# Patient Record
Sex: MALE | Race: OTHER | HISPANIC OR LATINO | Employment: FULL TIME | URBAN - NONMETROPOLITAN AREA
[De-identification: names, ages, dates, MRNs, and addresses within clinical notes are randomized per-mention and may not be internally consistent; named-entity substitution may affect disease eponyms.]

---

## 2023-01-13 ENCOUNTER — OCCUPATIONAL MEDICINE (OUTPATIENT)
Dept: URGENT CARE | Facility: CLINIC | Age: 48
End: 2023-01-13
Payer: COMMERCIAL

## 2023-01-13 VITALS
HEART RATE: 78 BPM | RESPIRATION RATE: 14 BRPM | SYSTOLIC BLOOD PRESSURE: 116 MMHG | WEIGHT: 199 LBS | OXYGEN SATURATION: 96 % | DIASTOLIC BLOOD PRESSURE: 70 MMHG | TEMPERATURE: 97.8 F
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
## 2023-01-13 DIAGNOSIS — S86.912A KNEE STRAIN, LEFT, INITIAL ENCOUNTER: ICD-10-CM

## 2023-01-13 DIAGNOSIS — H11.31 SUBCONJUNCTIVAL HEMORRHAGE OF RIGHT EYE: ICD-10-CM

## 2023-01-13 DIAGNOSIS — S39.012A LUMBOSACRAL STRAIN, INITIAL ENCOUNTER: ICD-10-CM

## 2023-01-13 DIAGNOSIS — Z02.1 PRE-EMPLOYMENT DRUG SCREENING: ICD-10-CM

## 2023-01-13 DIAGNOSIS — R20.0 NUMBNESS: ICD-10-CM

## 2023-01-13 DIAGNOSIS — M54.2 CERVICAL SPINE PAIN: ICD-10-CM

## 2023-01-13 DIAGNOSIS — S09.90XA CLOSED HEAD INJURY, INITIAL ENCOUNTER: ICD-10-CM

## 2023-01-13 DIAGNOSIS — Z02.83 ENCOUNTER FOR DRUG SCREENING: ICD-10-CM

## 2023-01-13 LAB
AMP AMPHETAMINE: NORMAL
BREATH ALCOHOL COMMENT: NORMAL
COC COCAINE: NORMAL
INT CON NEG: NEGATIVE
INT CON POS: POSITIVE
MET METHAMPHETAMINES: NORMAL
OPI OPIATES: NORMAL
PCP PHENCYCLIDINE: NORMAL
POC BREATHALIZER: 0 PERCENT (ref 0–0.01)
POC DRUG COMMENT 753798-OCCUPATIONAL HEALTH: NEGATIVE
THC: NORMAL

## 2023-01-13 PROCEDURE — 80305 DRUG TEST PRSMV DIR OPT OBS: CPT | Mod: 29 | Performed by: NURSE PRACTITIONER

## 2023-01-13 PROCEDURE — 99204 OFFICE O/P NEW MOD 45 MIN: CPT | Mod: 29 | Performed by: NURSE PRACTITIONER

## 2023-01-13 PROCEDURE — 82075 ASSAY OF BREATH ETHANOL: CPT | Mod: 29 | Performed by: NURSE PRACTITIONER

## 2023-01-13 NOTE — LETTER
"[facility]  [address] [place] [address]  Phone:  [phone] - Fax:  [phone]   [facility] Progress Report and Disability Certification  Date of Service: 1/13/2023   No Show:  No  Date / Time of Next Visit: 1/16/2023   Claim Information   Patient Name: Richard Koenig  Claim Number:     Employer: [employer]  Date of Injury: 1/11/2023     Insurer / TPA: [employer]  ID / SSN:     Occupation: Superviosr  Diagnosis: Diagnoses of Encounter for drug screening, Pre-employment drug screening, Fall, initial encounter, Closed head injury, initial encounter, Subconjunctival hemorrhage of right eye, Numbness, Cervical spine pain, Lumbosacral strain, initial encounter, and Knee strain, left, initial encounter were pertinent to this visit.    Medical Information   Related to Industrial Injury? Yes    Subjective Complaints:  DOI: 1/11/2023. Patient reportedly slipped and fell on ice after getting out of the truck on a downhill, stating he fell on his back in a twisting motion. Hit the back of his head. Denies LOC. Currently has a \"small: headache to posterior headache. Noticed redness to his right eye shortly after the fall. Has generalized left side pain with intermittent numbness, including his arm and leg. Pain greatest to left shoulder, back, and left knee. Was having weakness in bilateral hands the first day. Weakness has now resolved. No loss of bowel or bladder. Denies previous history of head injury. No report hx of issues with his back, shoulder, or knee.       Objective Findings: Physical Exam  Eyes:      Extraocular Movements: Extraocular movements intact.      Conjunctiva/sclera:      Right eye: Hemorrhage present.      Pupils: Pupils are equal, round, and reactive to light.   Musculoskeletal:         General: Tenderness present. No deformity.      Cervical back: No edema or erythema. Pain with movement, " spinous process tenderness and muscular tenderness present. Normal range of motion.      Comments: Midline lumbar sacral and bilateral paraspinal tenderness to palpation. Left medial knee tenderness to palpation. Lateral left shoulder tenderness to palpation.    Skin:     General: Skin is warm and dry.      Pre-Existing Condition(s): None reported.    Assessment:   Initial Visit    Status: Additional Care Required  Permanent Disability:No    Plan: MedicationTransfer Care    Diagnostics:      Comments:       Disability Information   Status: Released to Restricted Duty    From:  2023  Through: 2023 Restrictions are: Temporary   Physical Restrictions   Sitting:    Standing:    Stoopin hrs/day Bendin hrs/day   Squattin hrs/day Walking:    Climbin hrs/day Pushin hrs/day   Pullin hrs/day Other:    Reaching Above Shoulder (L): 0 hrs/day Reaching Above Shoulder (R):       Reaching Below Shoulder (L):    Reaching Below Shoulder (R):      Not to exceed Weight Limits   Carrying(hrs):   Weight Limit(lb): < or = to 10 pounds Lifting(hrs):   Weight  Limit(lb): < or = to 10 pounds   Comments: Patient complains of generalized left side numbness post traumatic head injury. Midline cervical tenderness to palpation. Referred to the ED for further evaluation.     -NSAID's (ibuprofen) and tylenol as directed for pain and inflammation.   -RICE Therapy: Rest, Ice, Compression, Elevation  -Compression with an elastic bandage for swelling.  -Range of motion exercises.  -Follow up in 3 days.      Repetitive Actions   Hands: i.e. Fine Manipulations from Grasping:     Feet: i.e. Operating Foot Controls:     Driving / Operate Machinery:     Health Care Provider’s Original or Electronic Signature  ELZA Vences. Health Care Provider’s Original or Electronic Signature    Enoc Bethea DO MPH     Clinic Name / Location: [facility] - [address]  [address] Clinic Phone Number: Dept: [phone]   Appointment Time: 4:05 Pm Visit Start Time: 4:35 PM   Check-In Time:  4:19 Pm Visit Discharge Time:  544pm   Original-Treating Physician or Chiropractor    Page 2-Insurer/TPA    Page 3-Employer    Page 4-Employee

## 2023-01-13 NOTE — LETTER
"EMPLOYEE’S CLAIM FOR COMPENSATION/ REPORT OF INITIAL TREATMENT  FORM C-4    EMPLOYEE’S CLAIM - PROVIDE ALL INFORMATION REQUESTED   First Name  Richard Last Name  Cookie Koenig Birthdate                    1975                Sex  male Claim Number (Insurer’s Use Only)    Home Address   [address] Age  47 y.o. 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 (Be specific an answer in detail. Use additional sheet if necessary)  i was walking and slipped on the ice   If you believe that you have an occupational disease, when did you first have knowledge of the disability and it relationship to your employment?   Witnesses to the Accident  N/A      Nature of Injury or Occupational Disease  Contusion  Part(s) of Body Injured or Affected  Spinal Cord - Neck, Upper Back Area (Thoracic Area), Upper Arm (L)    I certify that the above is true and correct to the best of my knowledge and that I have " provided this information in order to obtain the benefits of [place]  I hereby authorize any physician, chiropractor, surgeon, practitioner, or other person, any hospital, including [facility] hospital, any medical service organization, any insurance company, or other institution or organization to release to each other, any medical or other information, including benefits paid or payable, pertinent to this injury or disease, except information relative to diagnosis, treatment and/or counseling for AIDS, psychological conditions, alcohol or controlled substances, for which I must give specific authorization.  A Photostat of this authorization shall be as valid as the original.     Date   Place Employee’s Original or  *Electronic Signature   THIS REPORT MUST BE COMPLETED AND MAILED WITHIN 3 WORKING DAYS OF TREATMENT   Place  [facility]  Name of Facility  [facility]   Date  1/13/2023 Diagnosis and Description of Injury or Occupational Disease  (Z02.83) Encounter for drug screening  (Z02.1) Pre-employment drug screening  (W19.XXXA) Fall, initial encounter  (S09.90XA) Closed head injury, initial encounter  (H11.31) Subconjunctival hemorrhage of right eye  (R20.0) Numbness  (M54.2) Cervical spine pain  (S39.012A) Lumbosacral strain, initial encounter  (S86.912A) Knee strain, left, initial encounter Is there evidence the injured employee was under the influence of alcohol and/or another controlled substance at the time of accident?  ? No ? Yes (if yes, please explain)    Hour  4:35 PM   Diagnoses of Encounter for drug screening, Pre-employment drug screening, Fall, initial encounter, Closed head injury, initial encounter, Subconjunctival hemorrhage of right eye, Numbness, Cervical spine pain, Lumbosacral strain, initial encounter, and Knee strain, left, initial  encounter were pertinent to this visit. No   Treatment  Patient complains of generalized left side numbness post traumatic head injury. Midline cervical tenderness to palpation. Referred to the ED for further evaluation.     -NSAID's (ibuprofen) and tylenol as directed for pain and inflammation.   -RICE Therapy: Rest, Ice, Compression, Elevation  -Compression with an elastic bandage for swelling.  -Range of motion exercises.  -Follow up in 3 days.    Have you advised the patient to remain off work five days or     more?    X-Ray Findings      ? Yes Indicate dates:   From   To      From information given by the employee, together with medical evidence, can        you directly connect this injury or occupational disease as job incurred?  Yes ? No If no, is the injured employee capable of:  ? full duty  No ? modified duty  Yes   Is additional medical care by a physician indicated?  Yes If Modified Duty, Specify any Limitations / Restrictions  Note D-39   Do you know of any previous injury or disease contributing to this condition or occupational disease?  ? Yes ? No (Explain if yes)                          No   Date  1/13/2023 Print Health Care Provider's   AHMET Vences I certify the employer’s copy of  this form was mailed on:   Address  [address] Insurer’s Use Only     [place] [place] [facility] [place] Zip  [address]    Provider’s Tax ID Number  [id number] Telephone  Dept: [phone]             Health Care Provider’s Original or Electronic Signature  e-EDI Negrete Degree (MD,DO, DC,PA-C,APRN)   APRN      * Complete and attach Release of Information (Form C-4A) when injured employee signs C-4 Form electronically  ORIGINAL - TREATING HEALTHCARE PROVIDER PAGE 2 - INSURER/TPA PAGE 3 - EMPLOYER PAGE 4 - EMPLOYEE             Form C-4 (rev.08/21)           BRIEF DESCRIPTION OF RIGHTS AND BENEFITS  (Pursuant to [place]RS 616C.050)    Notice of Injury or Occupational Disease (Incident Report Form  "C-1): If an injury or occupational disease (OD) arises out of and in the course of employment, you must provide written notice to your employer as soon as practicable, but no later than 7 days after the accident or OD. Your employer shall maintain a sufficient supply of the required forms.    Claim for Compensation (Form C-4): If medical treatment is sought, the form C-4 is available at the place of initial treatment. A completed \"Claim for Compensation\" (Form C-4) must be filed within 90 days after an accident or OD. The treating physician or chiropractor must, within 3 working days after treatment, complete and mail to the employer, the employer's insurer and third-party , the Claim for Compensation.    Medical Treatment: If you require medical treatment for your on-the-job injury or OD, you may be required to select a physician or chiropractor from a list provided by your workers’ compensation insurer, if it has contracted with an Organization for Managed Care (MCO) or Preferred Provider Organization (PPO) or providers of health care. If your employer has not entered into a contract with an MCO or PPO, you may select a physician or chiropractor from the Panel of Physicians and Chiropractors. Any medical costs related to your industrial injury or OD will be paid by your insurer.    Temporary Total Disability (TTD): If your doctor has certified that you are unable to work for a period of at least 5 consecutive days, or 5 cumulative days in a 20-day period, or places restrictions on you that your employer does not accommodate, you may be entitled to TTD compensation.    Temporary Partial Disability (TPD): If the wage you receive upon reemployment is less than the compensation for TTD to which you are entitled, the insurer may be required to pay you TPD compensation to make up the difference. TPD can only be paid for a maximum of 24 months.    Permanent Partial Disability (PPD): When your medical " condition is stable and there is an indication of a PPD as a result of your injury or OD, within 30 days, your insurer must arrange for an evaluation by a rating physician or chiropractor to determine the degree of your PPD. The amount of your PPD award depends on the date of injury, the results of the PPD evaluation, your age and wage.    Permanent Total Disability (PTD): If you are medically certified by a treating physician or chiropractor as permanently and totally disabled and have been granted a PTD status by your insurer, you are entitled to receive monthly benefits not to exceed 66 2/3% of your average monthly wage. The amount of your PTD payments is subject to reduction if you previously received a lump-sum PPD award.    Vocational Rehabilitation Services: You may be eligible for vocational rehabilitation services if you are unable to return to the job due to a permanent physical impairment or permanent restrictions as a result of your injury or occupational disease.    Transportation and Per [place] Reimbursement: You may be eligible for travel expenses and per elton associated with medical treatment.    Reopening: You may be able to reopen your claim if your condition worsens after claim closure.     Appeal Process: If you disagree with a written determination issued by the insurer or the insurer does not respond to your request, you may appeal to the Department of Administration, , by following the instructions contained in your determination letter. You must appeal the determination within 70 days from the date of the determination letter at [address], [address], [place] [address], or [address], [address], [place] [address]. If you disagree with the  decision, you may appeal to the Department of Administration, . You must file your appeal within 30 days from the date of the  decision letter at [address], [address], [place], [place] [address], or [address], [place], [place] [address]. If you disagree with a decision of an , you may file a petition for judicial review with the District Court. You must do so within 30 days of the Appeal Officer’s decision. You may be represented by an  at your own expense or you may contact the [facility] for possible representation.    [place]  for Injured Workers (NAIW): If you disagree with a  decision, you may request that NAIW represent you without charge at an  Hearing. For information regarding denial of benefits, you may contact the [facility] at: [address], [address], [place] [address], [phone], or [address], [address], [place] [address], [phone]    To File a Complaint with the Division: If you wish to file a complaint with the  of the Division of Industrial Relations (DIR),  please contact the Workers’ Compensation Section, [address], [address], [place], [place] [address], telephone [phone], or [address], [address], [place], [place] [address], telephone [phone].    For assistance with Workers’ Compensation Issues: You may contact the [facility] Office for Consumer Health Assistance, [address], [address], [place] [address], Toll Free [phone], Web site: http://[facility].[place].gov/Programs/BELEN E-mail: belen@[facility].[place].gov              __________________________________________________________________                                    _________________            Employee Name / Signature                                                                                                                            Date                                                                                                                                                                                                                               D-2 (rev. 10/20)

## 2023-01-14 NOTE — PROGRESS NOTES
"Subjective     Richard Koenig is a 47 y.o. male who presents with Fall (DOI 1/11/23, Pt states slipped on ice getting out of work truck, head injury, back pain, body soreness, R eye redness)            DOI: 1/11/2023. Patient reportedly slipped and fell on ice after getting out of the truck on a downhill, stating he fell on his back in a twisting motion. Hit the back of his head. Denies LOC. Currently has a \"small: headache to posterior headache. Noticed redness to his right eye shortly after the fall. Has generalized left side pain with intermittent numbness, including his arm and leg. Pain greatest to left shoulder, back, and left knee. Was having weakness in bilateral hands the first day. Weakness has now resolved. No loss of bowel or bladder. Denies previous history of head injury. No report hx of issues with his back, shoulder, or knee.        Fall  Associated symptoms include headaches. Pertinent negatives include no loss of consciousness.     Review of Systems   Eyes:  Negative for pain.   Musculoskeletal:  Positive for back pain, falls and joint pain.   Neurological:  Positive for sensory change and headaches. Negative for loss of consciousness.   All other systems reviewed and are negative.           Objective     /70 (BP Location: Left arm, Patient Position: Sitting, BP Cuff Size: Adult)   Pulse 78   Temp 36.6 °C (97.8 °F) (Temporal)   Resp 14   Wt 90.3 kg (199 lb)   SpO2 96%      Physical Exam  Eyes:      Extraocular Movements: Extraocular movements intact.      Conjunctiva/sclera:      Right eye: Hemorrhage present.      Pupils: Pupils are equal, round, and reactive to light.   Musculoskeletal:         General: Tenderness present. No deformity.      Cervical back: No edema or erythema. Pain with movement, spinous process tenderness and muscular tenderness present. Normal range of motion.      Comments: Midline lumbar sacral and bilateral paraspinal tenderness to palpation. Left medial " knee tenderness to palpation. Lateral left shoulder tenderness to palpation.     Left leg strength, 4/5. Right leg, 5/5.   Skin:     General: Skin is warm and dry.   Neurological:      Mental Status: He is alert.                           Assessment & Plan     1. Fall, initial encounter    2. Closed head injury, initial encounter    3. Subconjunctival hemorrhage of right eye    4. Numbness    5. Cervical spine pain    6. Lumbosacral strain, initial encounter    7. Knee strain, left, initial encounter    8. Encounter for drug screening  - POCT 6 Panel Urine Drug Screen  - POCT Breath Alcohol Test    9. Pre-employment drug screening    Patient complains of generalized left side numbness post traumatic head injury. Midline cervical tenderness to palpation. Referred to the ED for further evaluation. Discussed risks.     -NSAID's (ibuprofen) and tylenol as directed for pain and inflammation.   -RICE Therapy: Rest, Ice, Compression, Elevation  -Compression with an elastic bandage for swelling.  -Range of motion exercises.  -Follow up in 3 days.       services used for visit.

## 2023-01-19 ASSESSMENT — ENCOUNTER SYMPTOMS
EYE PAIN: 0
BACK PAIN: 1
FALLS: 1
LOSS OF CONSCIOUSNESS: 0
SENSORY CHANGE: 1
HEADACHES: 1

## 2023-01-30 ENCOUNTER — OCCUPATIONAL MEDICINE (OUTPATIENT)
Dept: URGENT CARE | Facility: CLINIC | Age: 48
End: 2023-01-30
Payer: COMMERCIAL

## 2023-01-30 VITALS
OXYGEN SATURATION: 96 % | DIASTOLIC BLOOD PRESSURE: 68 MMHG | HEART RATE: 93 BPM | TEMPERATURE: 98.6 F | RESPIRATION RATE: 16 BRPM | WEIGHT: 208 LBS | BODY MASS INDEX: 29.78 KG/M2 | SYSTOLIC BLOOD PRESSURE: 122 MMHG | HEIGHT: 70 IN
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
## 2023-01-30 DIAGNOSIS — M54.40 ACUTE LOW BACK PAIN WITH SCIATICA, SCIATICA LATERALITY UNSPECIFIED, UNSPECIFIED BACK PAIN LATERALITY: ICD-10-CM

## 2023-01-30 DIAGNOSIS — M25.562 LEFT KNEE PAIN, UNSPECIFIED CHRONICITY: ICD-10-CM

## 2023-01-30 DIAGNOSIS — S09.90XD INJURY OF HEAD, SUBSEQUENT ENCOUNTER: ICD-10-CM

## 2023-01-30 PROCEDURE — 99213 OFFICE O/P EST LOW 20 MIN: CPT | Performed by: STUDENT IN AN ORGANIZED HEALTH CARE EDUCATION/TRAINING PROGRAM

## 2023-01-30 NOTE — LETTER
[facility]  [address] [place] [address]  Phone:  [phone] - Fax:  [phone]   [facility] Progress Report and Disability Certification  Date of Service: 1/30/2023   No Show:  No  Date / Time of Next Visit: 2/3/2023   Claim Information   Patient Name: Richard Koenig  Claim Number:     Employer: [employer]  Date of Injury: 1/11/2023     Insurer / TPA: [employer]  ID / SSN:     Occupation: Superviosr  Diagnosis: Diagnoses of Fall, subsequent encounter, Injury of head, subsequent encounter, Acute low back pain with sciatica, sciatica laterality unspecified, unspecified back pain laterality, and Left knee pain, unspecified chronicity were pertinent to this visit.    Medical Information   Related to Industrial Injury?      Subjective Complaints:  FV#2 (1/30/23): Patient states he is asked still experiencing low back pain.  Patient states low back pain is sometimes there and sometimes not there at all.  He does have radiation down his leg states sometimes radiation of pain is down left leg and sometimes down right leg but never both legs at the same time. Pertinent negatives include no abdominal pain, bladder incontinence, bowel incontinence, chest pain, fever, headaches, leg pain, numbness, paresis, paresthesias, pelvic pain, perianal numbness, tingling or weakness.  States he is also still experiencing left knee pain.  Patient denies headache, dizziness, nausea/vomiting/diarrhea.     Objective Findings: Musculoskeletal:      Cervical back: Normal.      Thoracic back: Normal.      Lumbar back: Tenderness present. No swelling, deformity, spasms or bony tenderness. Decreased range of motion. Negative right straight leg raise test and negative left straight leg raise test.      Left knee: No swelling or deformity. Decreased range of motion. Normal pulse.   Neurological:      General: No focal deficit present.       Mental Status: He is alert and oriented to person, place, and time. Mental status is at baseline.      GCS: GCS eye subscore is 4. GCS verbal subscore is 5. GCS motor subscore is 6.      Cranial Nerves: Cranial nerves 2-12 are intact.      Sensory: Sensation is intact.      Motor: Motor function is intact.      Coordination: Coordination is intact.      Gait: Gait is intact.      Pre-Existing Condition(s):     Assessment:   Condition Same    Status: Discharged / Care Transfer  Permanent Disability:No    Plan:      Diagnostics:      Comments:       Disability Information   Status: Released to Restricted Duty    From:     Through: 2/3/2023 Restrictions are:     Physical Restrictions   Sitting:    Standing:    Stooping:    Bendin hrs/day   Squattin hrs/day Walking:    Climbin hrs/day Pushing:      Pulling:    Other:    Reaching Above Shoulder (L):   Reaching Above Shoulder (R):       Reaching Below Shoulder (L):    Reaching Below Shoulder (R):      Not to exceed Weight Limits   Carrying(hrs):   Weight Limit(lb):   Comments:less than 5lbs Lifting(hrs):   Weight  Limit(lb):   Comments:less than 5lbs   Comments: Supportive care measures reviewed with patient. Follow up with Occupational Med.    Repetitive Actions   Hands: i.e. Fine Manipulations from Grasping:     Feet: i.e. Operating Foot Controls:     Driving / Operate Machinery:     Health Care Provider’s Original or Electronic Signature  Belle Peña P.A.-C. Health Care Provider’s Original or Electronic Signature    Enoc Bethea DO MPH     Clinic Name / Location: [address] [address] Clinic Phone Number: Dept: [phone]   Appointment Time: 4:05 Pm Visit Start Time: 4:25 PM   Check-In Time:  3:59 Pm Visit Discharge Time: 5:15 Pm    Original-Treating Physician or Chiropractor    Page 2-Insurer/TPA    Page 3-Employer    Page 4-Employee

## 2023-01-31 ASSESSMENT — ENCOUNTER SYMPTOMS
EYE PAIN: 0
BACK PAIN: 1
PHOTOPHOBIA: 0
DIARRHEA: 0
WHEEZING: 0
PALPITATIONS: 0
VOMITING: 0
BLURRED VISION: 0
HEADACHES: 0
DOUBLE VISION: 0
EYE REDNESS: 0
EYE DISCHARGE: 0
FEVER: 0
NAUSEA: 0
DIZZINESS: 0
LOSS OF CONSCIOUSNESS: 0
CHILLS: 0
SENSORY CHANGE: 0
SHORTNESS OF BREATH: 0
WEAKNESS: 0
CONSTIPATION: 0
FOCAL WEAKNESS: 0
ABDOMINAL PAIN: 0

## 2023-01-31 NOTE — PROGRESS NOTES
"Subjective     Richard Koenig is a 47 y.o. male who presents with Follow-Up (WC Follow on on fall, Back, L knee an L shoulder, feel like he's worse then when he came in, he' s back pain is radiating down both legs, and having numbness on legs, would like to see PT or Chiropractor , pt would like to see a specialist )      FV#2 (1/30/23): Patient states he is asked still experiencing low back pain.  Patient states low back pain is sometimes there and sometimes not there at all.  He does have radiation down his leg states sometimes radiation of pain is down left leg and sometimes down right leg but never both legs at the same time. Pertinent negatives include no abdominal pain, bladder incontinence, bowel incontinence, chest pain, fever, headaches, leg pain, numbness, paresis, paresthesias, pelvic pain, perianal numbness, tingling or weakness.  States he is also still experiencing left knee pain.  Patient denies headache, dizziness, nausea/vomiting/diarrhea.       DOI 1/11/23  MERE: Pt states slipped on ice getting out of work truck, head injury, back pain, body soreness, R eye redness  VISIT#1 (1/13/23): Patient reportedly slipped and fell on ice after getting out of the truck on a downhill, stating he fell on his back in a twisting motion. Hit the back of his head. Denies LOC. Currently has a \"small: headache to posterior headache. Noticed redness to his right eye shortly after the fall. Has generalized left side pain with intermittent numbness, including his arm and leg. Pain greatest to left shoulder, back, and left knee. Was having weakness in bilateral hands the first day. Weakness has now resolved. No loss of bowel or bladder. Denies previous history of head injury. No report hx of issues with his back, shoulder, or knee. Patient to [facility] ED for further evaluation.    FV#2 (1/30/23): Patient states he is asked still experiencing low back pain.  Patient states low back pain is sometimes there and " "sometimes not there at all.  He does have radiation down his leg states sometimes radiation of pain is down left leg and sometimes down right leg but never both legs at the same time. Pertinent negatives include no abdominal pain, bladder incontinence, bowel incontinence, chest pain, fever, headaches, leg pain, numbness, paresis, paresthesias, pelvic pain, perianal numbness, tingling or weakness.  States he is also still experiencing left knee pain.  Patient denies headache, dizziness, nausea/vomiting/diarrhea.    HPI    Review of Systems   Constitutional:  Negative for chills and fever.   Eyes:  Negative for blurred vision, double vision, photophobia, pain, discharge and redness.   Respiratory:  Negative for shortness of breath and wheezing.    Cardiovascular:  Negative for chest pain and palpitations.   Gastrointestinal:  Negative for abdominal pain, constipation, diarrhea, nausea and vomiting.   Musculoskeletal:  Positive for back pain and joint pain.   Neurological:  Negative for dizziness, sensory change, focal weakness, loss of consciousness, weakness and headaches.   All other systems reviewed and are negative.           Objective     /68   Pulse 93   Temp 37 °C (98.6 °F) (Temporal)   Resp 16   Ht 1.778 m (5' 10\")   Wt 94.3 kg (208 lb)   SpO2 96%   BMI 29.84 kg/m²      Physical Exam  Musculoskeletal:      Cervical back: Normal.      Thoracic back: Normal.      Lumbar back: Tenderness present. No swelling, deformity, spasms or bony tenderness. Decreased range of motion. Negative right straight leg raise test and negative left straight leg raise test.      Left knee: No swelling or deformity. Decreased range of motion. Normal pulse.   Neurological:      General: No focal deficit present.      Mental Status: He is alert and oriented to person, place, and time. Mental status is at baseline.      GCS: GCS eye subscore is 4. GCS verbal subscore is 5. GCS motor subscore is 6.      Cranial Nerves: " Cranial nerves 2-12 are intact.      Sensory: Sensation is intact.      Motor: Motor function is intact.      Coordination: Coordination is intact.      Gait: Gait is intact.       Musculoskeletal:      Cervical back: Normal.      Thoracic back: Normal.      Lumbar back: Tenderness present. No swelling, deformity, spasms or bony tenderness. Decreased range of motion. Negative right straight leg raise test and negative left straight leg raise test.      Left knee: No swelling or deformity. Decreased range of motion. Normal pulse.   Neurological:      General: No focal deficit present.      Mental Status: He is alert and oriented to person, place, and time. Mental status is at baseline.      GCS: GCS eye subscore is 4. GCS verbal subscore is 5. GCS motor subscore is 6.      Cranial Nerves: Cranial nerves 2-12 are intact.      Sensory: Sensation is intact.      Motor: Motor function is intact.      Coordination: Coordination is intact.      Gait: Gait is intact.                      Assessment & Plan        1. Fall, subsequent encounter  - Referral to Occupational Medicine    2. Injury of head, subsequent encounter  - Referral to Occupational Medicine    3. Acute low back pain with sciatica, sciatica laterality unspecified, unspecified back pain laterality  - Referral to Occupational Medicine    4. Left knee pain, unspecified chronicity  - Referral to Occupational Medicine     D39 completed.  Follow up with occupational medicine.    I personally reviewed prior external notes and test results pertinent to today's visit, including prior work comp visits in urgent care and in [facility].     Differential diagnoses, supportive care measures, and indications for immediate follow-up discussed with patient. Pathogenesis of diagnosis discussed including typical length and natural progression.      Instructed to return to urgent care or nearest emergency department  for any change in condition, further concerns, or new  concerning symptoms.    Patient states understanding and agree with the plan of care and discharge instructions.

## 2023-02-13 ENCOUNTER — OCCUPATIONAL MEDICINE (OUTPATIENT)
Dept: OCCUPATIONAL MEDICINE | Facility: CLINIC | Age: 48
End: 2023-02-13
Payer: COMMERCIAL

## 2023-02-13 VITALS
TEMPERATURE: 97.5 F | WEIGHT: 208 LBS | RESPIRATION RATE: 16 BRPM | BODY MASS INDEX: 29.78 KG/M2 | HEIGHT: 70 IN | OXYGEN SATURATION: 96 % | HEART RATE: 88 BPM

## 2023-02-13 DIAGNOSIS — S49.92XD INJURY OF LEFT SHOULDER, SUBSEQUENT ENCOUNTER: ICD-10-CM

## 2023-02-13 DIAGNOSIS — S16.1XXD STRAIN OF MUSCLE, FASCIA AND TENDON AT NECK LEVEL, SUBSEQUENT ENCOUNTER: ICD-10-CM

## 2023-02-13 DIAGNOSIS — M54.40 ACUTE BILATERAL LOW BACK PAIN WITH SCIATICA, SCIATICA LATERALITY UNSPECIFIED: ICD-10-CM

## 2023-02-13 DIAGNOSIS — S89.92XD INJURY OF LEFT KNEE, SUBSEQUENT ENCOUNTER: ICD-10-CM

## 2023-02-13 PROCEDURE — 99214 OFFICE O/P EST MOD 30 MIN: CPT | Performed by: NURSE PRACTITIONER

## 2023-02-13 RX ORDER — CYCLOBENZAPRINE HCL 5 MG
10 TABLET ORAL 3 TIMES DAILY PRN
Qty: 30 TABLET | Refills: 0 | Status: ON HOLD | OUTPATIENT
Start: 2023-02-13 | End: 2023-11-03

## 2023-02-13 ASSESSMENT — PAIN SCALES - GENERAL: PAINLEVEL: 7=MODERATE-SEVERE PAIN

## 2023-02-13 NOTE — PROGRESS NOTES
Subjective:     Richard Koenig is a 47 y.o. male who presents for Follow-Up (1/30/23 - Back, L knee and L shoulder - same - RM 16/)      VISIT#1 (1/13/23): Patient reportedly slipped and fell on ice after getting out of the truck on a downhill, stating he fell on his back in a twisting motion. Hit the back of his head.   used for most of the visit.  Patient states symptoms have remained unchanged particularly in the low back, the left knee, and is now experiencing some left shoulder pain.  He states that the tingling will sometimes radiate down both legs, when he goes to stand up he sometimes feels a little weak, he is turning his body certain ways to accommodate for symptoms, and driving can sometimes exacerbate symptoms.Pertinent negatives include no abdominal pain, bladder incontinence, bowel incontinence, chest pain, fever, headaches, leg pain, numbness, paresis, paresthesias, pelvic pain, perianal numbness, tingling or weakness.  No knee pain is intermittent in nature and can be isolated to the entire knee at times.  He denies knee instability, swelling, or bruising to the area.  He states that the shoulder pain has begun to increase in severity as time goes by and can be exacerbated by such movements is turning steering well.  He denies numbness or tingling or shoulder weakness.  He takes OTC medication if needed.  He has been able to tolerate light duty with some difficulty.  MRIs ordered and physiatry referral placed for further evaluation and management of symptoms.  Plan of care discussed with patient.    ROS: All systems were reviewed on intake form, form was reviewed and signed. See scanned documents in media. Pertinent positives and negatives included in HPI.    PMH: No pertinent past medical history to this problem  MEDS: Medications were reviewed in Epic  ALLERGIES: No Known Allergies  SOCHX: Works as Supervisor at [email or web address] FH: No pertinent family history to  "this problem       Objective:     Pulse 88   Temp 36.4 °C (97.5 °F) (Temporal)   Resp 16   Ht 1.778 m (5' 10\")   Wt 94.3 kg (208 lb)   SpO2 96%   BMI 29.84 kg/m²     [unfilled]    Lumbar: No gross deformity or discoloration noted.  Decreased range of motion.  Discomfort noted with rotation bilaterally and flexion.  There is mild TTP noted to the lumbar sacral paraspinal musculature.  No muscle spasms appreciated.  Minimal difficulty with heel toe walk.  Straight leg test positive on the left, negative on the right.  No gait abnormalities noted.  Left shoulder: No gross deformity or discoloration noted.  Full range of motion with mild discomfort at shoulder height.   strength is 4/5, secondary to discomfort.  Negative edema, crepitus, abnormal warmth, or ecchymosis to the joint.  Left knee: No gross deformity or discoloration noted.  There is mild TTP noted to the medial joint line, patellar joint line, and lateral joint line, denies all other tenderness.  Special tests negative.  No gait abnormalities noted.  Distal pedal and dorsal pulses 2+.  HEENT: EOMI, PERRLA, patient answers questions appropriately and in a timely manner.  Cranial nerves I through XII grossly intact.  There is mild posterior cervical tenderness noted.  Full range of motion noted with mild discomfort with rotation to the left.  No step-offs noted    Assessment/Plan:       1. Strain of muscle, fascia and tendon at neck level, subsequent encounter  - cyclobenzaprine (FLEXERIL) 5 mg tablet; Take 2 Tablets by mouth 3 times a day as needed for Moderate Pain or Muscle Spasms.  Dispense: 30 Tablet; Refill: 0  - MR-CERVICAL SPINE-W/O; Future  - Referral to Radiology  - Referral to Physical Therapy  - Referral to Pain Clinic    2. Injury of left shoulder, subsequent encounter  - cyclobenzaprine (FLEXERIL) 5 mg tablet; Take 2 Tablets by mouth 3 times a day as needed for Moderate Pain or Muscle Spasms.  Dispense: 30 Tablet; Refill: 0  - " MR-SHOULDER-W/O LEFT; Future  - Referral to Radiology  - Referral to Physical Therapy  - Referral to Pain Clinic    3. Injury of left knee, subsequent encounter  - cyclobenzaprine (FLEXERIL) 5 mg tablet; Take 2 Tablets by mouth 3 times a day as needed for Moderate Pain or Muscle Spasms.  Dispense: 30 Tablet; Refill: 0  - MR-KNEE-W/O LEFT; Future  - Referral to Radiology  - Referral to Physical Therapy  - Referral to Pain Clinic    4. Acute bilateral low back pain with sciatica, sciatica laterality unspecified  - cyclobenzaprine (FLEXERIL) 5 mg tablet; Take 2 Tablets by mouth 3 times a day as needed for Moderate Pain or Muscle Spasms.  Dispense: 30 Tablet; Refill: 0  - MR-LUMBAR SPINE-W/O; Future  - Referral to Radiology  - Referral to Physical Therapy  - Referral to Pain Clinic    Released to Restricted Duty FROM 2/13/2023 TO 2/27/2023     Follow-up in 2 weeks, unless seen by physiatry  Restricted duty, per physiatry  Physiatry referral placed, transfer care  MRIs ordered  Physical therapy referral placed  Recommend continue with OTC Tylenol/ibuprofen, ice/heat application, and OTC topical ointments i.e. Tiger balm, Voltaren gel, or Biofreeze  Take cyclobenzaprine as prescribed do not drive or work while taking this medication due to possible sedating effects  Strict ED precautions discussed with patient    Differential diagnosis, natural history, supportive care, and indications for immediate follow-up discussed.    Approximately 45 minutes were spent in reviewing notes, preparing for visit, obtaining history, exam and evaluation, patient counseling/education and post visit documentation/orders.

## 2023-02-13 NOTE — LETTER
[address],   [address] [place] [address]  Phone:  [phone] - Fax:  [phone]   Occupational Health [facility] Progress Report and Disability Certification  Date of Service: 2/13/2023   No Show:  No  Date / Time of Next Visit: 2/27/2023@2:45PM   Claim Information   Patient Name: Richard Koenig  Claim Number:     Employer: [employer]  Date of Injury: 1/11/2023     Insurer / TPA: [employer]  ID / SSN:     Occupation: Superviosr  Diagnosis: Diagnoses of Strain of muscle, fascia and tendon at neck level, subsequent encounter, Injury of left shoulder, subsequent encounter, Injury of left knee, subsequent encounter, and Acute bilateral low back pain with sciatica, sciatica laterality unspecified were pertinent to this visit.    Medical Information   Related to Industrial Injury? Yes    Subjective Complaints:  VISIT#1 (1/13/23): Patient reportedly slipped and fell on ice after getting out of the truck on a downhill, stating he fell on his back in a twisting motion. Hit the back of his head.   used for most of the visit.  Patient states symptoms have remained unchanged particularly in the low back, the left knee, and is now experiencing some left shoulder pain.  He states that the tingling will sometimes radiate down both legs, when he goes to stand up he sometimes feels a little weak, he is turning his body certain ways to accommodate for symptoms, and driving can sometimes exacerbate symptoms.Pertinent negatives include no abdominal pain, bladder incontinence, bowel incontinence, chest pain, fever, headaches, leg pain, numbness, paresis, paresthesias, pelvic pain, perianal numbness, tingling or weakness.  No knee pain is intermittent in nature and can be isolated to the entire knee at times.  He denies knee instability, swelling, or bruising to the area.  He states that the shoulder pain has begun to increase in  severity as time goes by and can be exacerbated by such movements is turning steering well.  He denies numbness or tingling or shoulder weakness.  He takes OTC medication if needed.  He has been able to tolerate light duty with some difficulty.  MRIs ordered and physiatry referral placed for further evaluation and management of symptoms.  Plan of care discussed with patient.   Objective Findings: Lumbar: No gross deformity or discoloration noted.  Decreased range of motion.  Discomfort noted with rotation bilaterally and flexion.  There is mild TTP noted to the lumbar sacral paraspinal musculature.  No muscle spasms appreciated.  Minimal difficulty with heel toe walk.  Straight leg test positive on the left, negative on the right.  No gait abnormalities noted.  Left shoulder: No gross deformity or discoloration noted.  Full range of motion with mild discomfort at shoulder height.   strength is 4/5, secondary to discomfort.  Negative edema, crepitus, abnormal warmth, or ecchymosis to the joint.  Left knee: No gross deformity or discoloration noted.  There is mild TTP noted to the medial joint line, patellar joint line, and lateral joint line, denies all other tenderness.  Special tests negative.  No gait abnormalities noted.  Distal pedal and dorsal pulses 2+.  HEENT: EOMI, PERRLA, patient answers questions appropriately and in a timely manner.  Cranial nerves I through XII grossly intact.  There is mild posterior cervical tenderness noted.  Full range of motion noted with mild discomfort with rotation to the left.  No step-offs noted   Pre-Existing Condition(s):     Assessment:   Condition Same    Status: Discharged / Care Transfer  Permanent Disability:No    Plan: Transfer CareMedication (NOT at Work)DiagnosticsPT    Diagnostics: MRI    Comments:  Follow-up in 2 weeks, unless seen by physiatry  Restricted duty, per physiatry  Physiatry referral placed, transfer care  MRIs ordered  Physical therapy referral  placed  Recommend continue with OTC Tylenol/ibuprofen, ice/heat application, and OTC topical ointments i.e. Tiger balm, Voltaren gel, or Biofreeze  Take cyclobenzaprine as prescribed do not drive or work while taking this medication due to possible sedating effects  Strict ED precautions discussed with patient    Disability Information   Status: Released to Restricted Duty    From:  2/13/2023  Through: 2/27/2023 Restrictions are: Temporary   Physical Restrictions   Sitting:    Standing:    Stooping:    Bending:      Squatting:    Walking:    Climbing:    Pushing:      Pulling:    Other:    Reaching Above Shoulder (L):   Reaching Above Shoulder (R):       Reaching Below Shoulder (L):    Reaching Below Shoulder (R):      Not to exceed Weight Limits   Carrying(hrs):   Weight Limit(lb): < or = to 10 pounds Lifting(hrs):   Weight  Limit(lb): < or = to 10 pounds   Comments:      Repetitive Actions   Hands: i.e. Fine Manipulations from Grasping:     Feet: i.e. Operating Foot Controls:     Driving / Operate Machinery:     Health Care Provider’s Original or Electronic Signature  AHMET [facility] Health Care Provider’s Original or Electronic Signature    Enoc Bethea DO MPH     Clinic Name / Location: [address],   [address] [address] Clinic Phone Number: Dept: [phone]   Appointment Time: 3:30 Pm Visit Start Time: 3:10 PM   Check-In Time:  3:02 Pm Visit Discharge Time:  4:11pm   Original-Treating Physician or Chiropractor    Page 2-Insurer/TPA    Page 3-Employer    Page 4-Employee

## 2023-03-01 ENCOUNTER — HOSPITAL ENCOUNTER (OUTPATIENT)
Dept: RADIOLOGY | Facility: MEDICAL CENTER | Age: 48
End: 2023-03-01
Attending: NURSE PRACTITIONER
Payer: COMMERCIAL

## 2023-03-01 DIAGNOSIS — S16.1XXD STRAIN OF MUSCLE, FASCIA AND TENDON AT NECK LEVEL, SUBSEQUENT ENCOUNTER: ICD-10-CM

## 2023-03-01 DIAGNOSIS — S89.92XD INJURY OF LEFT KNEE, SUBSEQUENT ENCOUNTER: ICD-10-CM

## 2023-03-01 DIAGNOSIS — M54.40 ACUTE BILATERAL LOW BACK PAIN WITH SCIATICA, SCIATICA LATERALITY UNSPECIFIED: ICD-10-CM

## 2023-03-01 DIAGNOSIS — S49.92XD INJURY OF LEFT SHOULDER, SUBSEQUENT ENCOUNTER: ICD-10-CM

## 2023-03-01 PROCEDURE — 73221 MRI JOINT UPR EXTREM W/O DYE: CPT | Mod: LT

## 2023-03-01 PROCEDURE — 72141 MRI NECK SPINE W/O DYE: CPT

## 2023-03-01 PROCEDURE — 72148 MRI LUMBAR SPINE W/O DYE: CPT

## 2023-03-01 PROCEDURE — 73721 MRI JNT OF LWR EXTRE W/O DYE: CPT | Mod: LT

## 2023-03-07 ENCOUNTER — OCCUPATIONAL MEDICINE (OUTPATIENT)
Dept: OCCUPATIONAL MEDICINE | Facility: CLINIC | Age: 48
End: 2023-03-07
Payer: COMMERCIAL

## 2023-03-07 VITALS
BODY MASS INDEX: 28.63 KG/M2 | RESPIRATION RATE: 18 BRPM | SYSTOLIC BLOOD PRESSURE: 128 MMHG | DIASTOLIC BLOOD PRESSURE: 80 MMHG | OXYGEN SATURATION: 94 % | HEIGHT: 70 IN | WEIGHT: 200 LBS | HEART RATE: 84 BPM

## 2023-03-07 DIAGNOSIS — M54.40 ACUTE BILATERAL LOW BACK PAIN WITH SCIATICA, SCIATICA LATERALITY UNSPECIFIED: ICD-10-CM

## 2023-03-07 DIAGNOSIS — S16.1XXD STRAIN OF MUSCLE, FASCIA AND TENDON AT NECK LEVEL, SUBSEQUENT ENCOUNTER: ICD-10-CM

## 2023-03-07 DIAGNOSIS — S89.92XD INJURY OF LEFT KNEE, SUBSEQUENT ENCOUNTER: ICD-10-CM

## 2023-03-07 DIAGNOSIS — S49.92XD INJURY OF LEFT SHOULDER, SUBSEQUENT ENCOUNTER: ICD-10-CM

## 2023-03-07 PROCEDURE — 99213 OFFICE O/P EST LOW 20 MIN: CPT | Performed by: NURSE PRACTITIONER

## 2023-03-07 ASSESSMENT — ENCOUNTER SYMPTOMS
NECK PAIN: 1
MYALGIAS: 1
TINGLING: 0
WEAKNESS: 0
BACK PAIN: 1
RESPIRATORY NEGATIVE: 1
CARDIOVASCULAR NEGATIVE: 1
CONSTITUTIONAL NEGATIVE: 1
PSYCHIATRIC NEGATIVE: 1
SENSORY CHANGE: 0

## 2023-03-07 NOTE — LETTER
[address],   [address] [place] [address]  Phone:  [phone] - Fax:  [phone]   Occupational Health [facility] Progress Report and Disability Certification  Date of Service: 3/7/2023   No Show:  No  Date / Time of Next Visit: 4/6/2023@4:00PM   Claim Information   Patient Name: Richard Koenig  Claim Number:     Employer: [employer]  Date of Injury: 1/11/2023     Insurer / TPA: [employer]  ID / SSN:     Occupation: Superviosr  Diagnosis: Diagnoses of Strain of muscle, fascia and tendon at neck level, subsequent encounter, Injury of left shoulder, subsequent encounter, Injury of left knee, subsequent encounter, and Acute bilateral low back pain with sciatica, sciatica laterality unspecified were pertinent to this visit.    Medical Information   Related to Industrial Injury? Yes    Subjective Complaints:  (1/13/23): Patient reportedly slipped and fell on ice after getting out of the truck on a downhill, stating he fell on his back in a twisting motion. Hit the back of his head.   used for most of the visit.  Patient states symptoms have remained unchanged particularly in the low back and the left knee.  He states that the tingling will sometimes radiate down both legs, when he goes to stand up he sometimes feels a little weak.  Pertinent negatives include no abdominal pain, bladder incontinence, bowel incontinence, chest pain, fever, headaches, leg pain, numbness, paresis, paresthesias, pelvic pain, perianal numbness, tingling or weakness.  Left knee pain is intermittent in nature and can be isolated to the entire knee at times.  He denies knee instability, swelling, or bruising to the area.  He takes OTC medication if needed.  He states that the muscle relaxer was not helpful.  He has been able to tolerate light duty.  MRIs results reviewed with patient.  Physiatry has been approved, pending scheduling.   Orthopedic referral is being placed for the left knee.  Plan of care discussed with patient.   Objective Findings: Lumbar: No gross deformity or discoloration noted.  Decreased range of motion.  Discomfort noted with rotation bilaterally and flexion.  There is mild TTP noted to the lumbar sacral paraspinal musculature.  No muscle spasms appreciated.  Minimal difficulty with heel toe walk.  Straight leg test positive on the left, negative on the right.  No gait abnormalities noted.  Left shoulder: No gross deformity or discoloration noted.  Full range of motion with mild discomfort at shoulder height.   strength is 4/5, secondary to discomfort.  Negative edema, crepitus, abnormal warmth, or ecchymosis to the joint.  Left knee: No gross deformity or discoloration noted.  There is mild TTP noted to the medial joint line, patellar joint line, and lateral joint line, denies all other tenderness.  Special tests negative.  No gait abnormalities noted.  Distal pedal and dorsal pulses 2+.  HEENT: EOMI, PERRLA, patient answers questions appropriately and in a timely manner.  Cranial nerves I through XII grossly intact.  There is mild posterior cervical tenderness noted.  Full range of motion noted with mild discomfort with rotation to the left.  No step-offs noted   Pre-Existing Condition(s):     Assessment:   Condition Same    Status: Discharged / Care Transfer  Permanent Disability:No    Plan: Transfer CarePT    Diagnostics:      Comments:  Follow-up in 4 weeks, unless seen by orthopedics  Restricted duty, per physiatry and orthopedics  Physiatry referral placed, transfer care pending scheduling  Physical therapy appointments as scheduled  Knee brace while at work otherwise wean at home as tolerated  Recommend continue with OTC Tylenol/ibuprofen, ice/heat application, and OTC topical ointments i.e. Tiger balm, Voltaren gel, or Biofreeze  Strict ED precautions discussed with patient    Disability Information   Status:  Released to Restricted Duty    From:  3/7/2023  Through: 4/6/2023 Restrictions are: Temporary   Physical Restrictions   Sitting:    Standing:    Stooping:    Bending:      Squatting:    Walking:    Climbing:    Pushing:      Pulling:    Other:    Reaching Above Shoulder (L):   Reaching Above Shoulder (R):       Reaching Below Shoulder (L):    Reaching Below Shoulder (R):      Not to exceed Weight Limits   Carrying(hrs):   Weight Limit(lb): < or = to 10 pounds Lifting(hrs):   Weight  Limit(lb): < or = to 10 pounds   Comments:      Repetitive Actions   Hands: i.e. Fine Manipulations from Grasping:     Feet: i.e. Operating Foot Controls:     Driving / Operate Machinery:     Health Care Provider’s Original or Electronic Signature  AHMET [facility] Health Care Provider’s Original or Electronic Signature    Enoc Bethea DO MPH     Clinic Name / Location: [address]   [address] [place] [address] Clinic Phone Number: Dept: [phone]   Appointment Time: 4:30 Pm Visit Start Time: 4:32 PM   Check-In Time:  4:18 Pm Visit Discharge Time:  5:00PM   Original-Treating Physician or Chiropractor    Page 2-Insurer/TPA    Page 3-Employer    Page 4-Employee

## 2023-03-08 NOTE — PROGRESS NOTES
"Subjective:     Richard Koenig is a 47 y.o. male who presents for Follow-Up (WC DOI neck/back/Lt arm, rm 16)      (1/13/23): Patient reportedly slipped and fell on ice after getting out of the truck on a downhill, stating he fell on his back in a twisting motion. Hit the back of his head.   used for most of the visit.  Patient states symptoms have remained unchanged particularly in the low back and the left knee.  He states that the tingling will sometimes radiate down both legs, when he goes to stand up he sometimes feels a little weak.  Pertinent negatives include no abdominal pain, bladder incontinence, bowel incontinence, chest pain, fever, headaches, leg pain, numbness, paresis, paresthesias, pelvic pain, perianal numbness, tingling or weakness.  Left knee pain is intermittent in nature and can be isolated to the entire knee at times.  He denies knee instability, swelling, or bruising to the area.  He takes OTC medication if needed.  He states that the muscle relaxer was not helpful.  He has been able to tolerate light duty.  MRIs results reviewed with patient.  Physiatry has been approved, pending scheduling.  Orthopedic referral is being placed for the left knee.  Plan of care discussed with patient.    Review of Systems   Constitutional: Negative.    Respiratory: Negative.     Cardiovascular: Negative.    Musculoskeletal:  Positive for back pain, joint pain, myalgias and neck pain.   Skin: Negative.    Neurological:  Negative for tingling, sensory change and weakness.   Psychiatric/Behavioral: Negative.       SOCHX: Works as Supervisor at [employer]   FH: No pertinent family history to this problem       Objective:     /80   Pulse 84   Resp 18   Ht 1.778 m (5' 10\")   Wt 90.7 kg (200 lb)   SpO2 94%   BMI 28.70 kg/m²     Constitutional: Patient is in no acute distress. Appears well-developed and well-nourished.   Cardiovascular: Normal rate.  "   Pulmonary/Chest: Effort normal. No respiratory distress.   Neurological: Patient is alert and oriented to person, place, and time.   Skin: Skin is warm and dry.   Psychiatric: Normal mood and affect. Behavior is normal.     Lumbar: No gross deformity or discoloration noted.  Decreased range of motion.  Discomfort noted with rotation bilaterally and flexion.  There is mild TTP noted to the lumbar sacral paraspinal musculature.  No muscle spasms appreciated.  Minimal difficulty with heel toe walk.  Straight leg test positive on the left, negative on the right.  No gait abnormalities noted.  Left shoulder: No gross deformity or discoloration noted.  Full range of motion with mild discomfort at shoulder height.   strength is 4/5, secondary to discomfort.  Negative edema, crepitus, abnormal warmth, or ecchymosis to the joint.  Left knee: No gross deformity or discoloration noted.  There is mild TTP noted to the medial joint line, patellar joint line, and lateral joint line, denies all other tenderness.  Special tests negative.  No gait abnormalities noted.  Distal pedal and dorsal pulses 2+.  HEENT: EOMI, PERRLA, patient answers questions appropriately and in a timely manner.  Cranial nerves I through XII grossly intact.  There is mild posterior cervical tenderness noted.  Full range of motion noted with mild discomfort with rotation to the left.  No step-offs noted    Assessment/Plan:       1. Strain of muscle, fascia and tendon at neck level, subsequent encounter    2. Injury of left shoulder, subsequent encounter    3. Injury of left knee, subsequent encounter    4. Acute bilateral low back pain with sciatica, sciatica laterality unspecified    Released to Restricted Duty FROM 3/7/2023 TO 4/6/2023       Follow-up in 4 weeks, unless seen by orthopedics  Restricted duty, per physiatry and orthopedics  Physiatry referral placed, transfer care pending scheduling  Physical therapy appointments as scheduled  Knee  brace while at work otherwise wean at home as tolerated  Recommend continue with OTC Tylenol/ibuprofen, ice/heat application, and OTC topical ointments i.e. Tiger balm, Voltaren gel, or Biofreeze  Strict ED precautions discussed with patient    Differential diagnosis, natural history, supportive care, and indications for immediate follow-up discussed.    Approximately 25 minutes was spent in preparing for visit, obtaining history, exam and evaluation, patient counseling/education and post visit documentation/orders.

## 2023-04-06 ENCOUNTER — OCCUPATIONAL MEDICINE (OUTPATIENT)
Dept: OCCUPATIONAL MEDICINE | Facility: CLINIC | Age: 48
End: 2023-04-06
Payer: COMMERCIAL

## 2023-04-06 VITALS
SYSTOLIC BLOOD PRESSURE: 122 MMHG | WEIGHT: 205 LBS | TEMPERATURE: 96.8 F | HEART RATE: 89 BPM | BODY MASS INDEX: 29.35 KG/M2 | HEIGHT: 70 IN | DIASTOLIC BLOOD PRESSURE: 84 MMHG | OXYGEN SATURATION: 95 %

## 2023-04-06 DIAGNOSIS — M54.40 ACUTE BILATERAL LOW BACK PAIN WITH SCIATICA, SCIATICA LATERALITY UNSPECIFIED: ICD-10-CM

## 2023-04-06 DIAGNOSIS — S16.1XXD STRAIN OF MUSCLE, FASCIA AND TENDON AT NECK LEVEL, SUBSEQUENT ENCOUNTER: ICD-10-CM

## 2023-04-06 DIAGNOSIS — S49.92XD INJURY OF LEFT SHOULDER, SUBSEQUENT ENCOUNTER: ICD-10-CM

## 2023-04-06 DIAGNOSIS — S89.92XD INJURY OF LEFT KNEE, SUBSEQUENT ENCOUNTER: ICD-10-CM

## 2023-04-06 PROCEDURE — 99213 OFFICE O/P EST LOW 20 MIN: CPT | Performed by: NURSE PRACTITIONER

## 2023-04-06 ASSESSMENT — ENCOUNTER SYMPTOMS
SENSORY CHANGE: 0
CARDIOVASCULAR NEGATIVE: 1
BACK PAIN: 1
WEAKNESS: 0
CONSTITUTIONAL NEGATIVE: 1
MYALGIAS: 1
TINGLING: 0
RESPIRATORY NEGATIVE: 1
PSYCHIATRIC NEGATIVE: 1

## 2023-04-06 NOTE — LETTER
[address],   [address] [place] [address]  Phone:  [phone] - Fax:  [phone]   Occupational Health [facility] Progress Report and Disability Certification  Date of Service: 4/6/2023   No Show:  No  Date / Time of Next Visit: 5/4/2023@2:45 pm   Claim Information   Patient Name: Richard Koenig  Claim Number:     Employer: [employer]  Date of Injury: 1/11/2023     Insurer / TPA: [employer]  ID / SSN:     Occupation: Superviosr  Diagnosis: Diagnoses of Strain of muscle, fascia and tendon at neck level, subsequent encounter, Injury of left shoulder, subsequent encounter, Injury of left knee, subsequent encounter, and Acute bilateral low back pain with sciatica, sciatica laterality unspecified were pertinent to this visit.    Medical Information   Related to Industrial Injury? Yes    Subjective Complaints:  1/13/23): Patient reportedly slipped and fell on ice after getting out of the truck on a downhill, stating he fell on his back in a twisting motion. Hit the back of his head.   deferred at this visit.  Patient states symptoms have remained unchanged, the knee is feeling worse.  Continued  tingling radiate down both legs, when he goes to stand up he sometimes feels a little weak.  He has increased pain with lifting more than 10lbs and sitting for extended period of times. Pertinent negatives include no abdominal pain, bladder incontinence, bowel incontinence, chest pain, fever, headaches, leg pain, numbness, paresis, paresthesias, pelvic pain, perianal numbness, tingling or weakness.  Left knee pain is intermittent in nature and getting worse.  The knee brace can be aggravating at times. He denies knee instability, swelling, or bruising to the area.  He takes OTC medication if needed.  He states that the muscle relaxer is not helpful, but he will take it if needed.  He has been able to tolerate light  duty.  Physiatry has been approved he has an appointment next week.  Orthopedic has been approved, pending scheduling.  Plan of care discussed with patient.      Objective Findings: Lumbar: No gross deformity or discoloration noted.  Decreased range of motion.  Discomfort noted with rotation bilaterally and flexion.  There is mild TTP noted to the lumbar sacral paraspinal musculature.  No muscle spasms appreciated.  Minimal difficulty with heel toe walk.  Straight leg test positive on the left, negative on the right.  No gait abnormalities noted.  Left shoulder: No gross deformity or discoloration noted.  Full range of motion with mild discomfort at shoulder height.   strength is 4/5, secondary to discomfort.  Negative edema, crepitus, abnormal warmth, or ecchymosis to the joint.  Left knee: No gross deformity or discoloration noted.  There is moderate TTP noted to the medial joint line, patellar joint line, and lateral joint line, denies all other tenderness.  Special tests negative.  No gait abnormalities noted.  Distal pedal and dorsal pulses 2+.  Neck:  There is mild posterior cervical tenderness noted.  Full range of motion noted with mild discomfort with rotation to the left.  No step-offs noted   Pre-Existing Condition(s):     Assessment:        Status: Discharged / Care Transfer  Permanent Disability:No    Plan: Transfer CareMedication (NOT at Work)    Diagnostics:      Comments:  Follow-up in 4 weeks, unless seen by orthopedics  Restricted duty, per physiatry and orthopedics  Physiatry referral placed, transfer care pending scheduling  Physical therapy appointments as scheduled  Knee brace while at work otherwise wean at home as tolerated  Recommend continue with OTC Tylenol/ibuprofen, ice/heat application, and OTC topical ointments i.e. Tiger balm, Voltaren gel, or Biofreeze  Strict ED precautions discussed with patient    Disability Information   Status: Released to Restricted Duty    From:   4/6/2023  Through: 5/4/2023 Restrictions are: Temporary   Physical Restrictions   Sitting:    Standing:    Stooping:    Bending:      Squatting:    Walking:    Climbing:    Pushing:      Pulling:    Other:    Reaching Above Shoulder (L):   Reaching Above Shoulder (R):       Reaching Below Shoulder (L):    Reaching Below Shoulder (R):      Not to exceed Weight Limits   Carrying(hrs):   Weight Limit(lb): < or = to 10 pounds Lifting(hrs):   Weight  Limit(lb): < or = to 10 pounds   Comments:      Repetitive Actions   Hands: i.e. Fine Manipulations from Grasping:     Feet: i.e. Operating Foot Controls:     Driving / Operate Machinery:     Health Care Provider’s Original or Electronic Signature  AHMET Morejon Health Care Provider’s Original or Electronic Signature    Enoc Bethea DO MPH     Clinic Name / Location: [address] [address] Clinic Phone Number: Dept: [phone]   Appointment Time: 4:00 Pm Visit Start Time: 3:25 PM   Check-In Time:  3:22 Pm Visit Discharge Time:  4:02 PM   Original-Treating Physician or Chiropractor    Page 2-Insurer/TPA    Page 3-Employer    Page 4-Employee

## 2023-04-06 NOTE — PROGRESS NOTES
"Subjective:     Richard Koenig is a 47 y.o. male who presents for Other (WC DOI 1/22/23 neck, back injury, feeling room 17)      1/13/23): Patient reportedly slipped and fell on ice after getting out of the truck on a downhill, stating he fell on his back in a twisting motion. Hit the back of his head.   deferred at this visit.  Patient states symptoms have remained unchanged, the knee is feeling worse.  Continued  tingling radiate down both legs, when he goes to stand up he sometimes feels a little weak.  He has increased pain with lifting more than 10lbs and sitting for extended period of times. Pertinent negatives include no abdominal pain, bladder incontinence, bowel incontinence, chest pain, fever, headaches, leg pain, numbness, paresis, paresthesias, pelvic pain, perianal numbness, tingling or weakness.  Left knee pain is intermittent in nature and getting worse.  The knee brace can be aggravating at times. He denies knee instability, swelling, or bruising to the area.  He takes OTC medication if needed.  He states that the muscle relaxer is not helpful, but he will take it if needed.  He has been able to tolerate light duty.  Physiatry has been approved he has an appointment next week.  Orthopedic has been approved, pending scheduling.  Plan of care discussed with patient.       Review of Systems   Constitutional: Negative.    Respiratory: Negative.     Cardiovascular: Negative.    Musculoskeletal:  Positive for back pain, joint pain and myalgias.   Skin: Negative.    Neurological:  Negative for tingling, sensory change and weakness.   Psychiatric/Behavioral: Negative.       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       Objective:     /84   Pulse 89   Temp 36 °C (96.8 °F)   Ht 1.778 m (5' 10\")   Wt 93 kg (205 lb)   SpO2 95%   BMI 29.41 kg/m²     Constitutional: Patient is in no acute distress. Appears " well-developed and well-nourished.   Cardiovascular: Normal rate.    Pulmonary/Chest: Effort normal. No respiratory distress.   Neurological: Patient is alert and oriented to person, place, and time.   Skin: Skin is warm and dry.   Psychiatric: Normal mood and affect. Behavior is normal.     Lumbar: No gross deformity or discoloration noted.  Decreased range of motion.  Discomfort noted with rotation bilaterally and flexion.  There is mild TTP noted to the lumbar sacral paraspinal musculature.  No muscle spasms appreciated.  Minimal difficulty with heel toe walk.  Straight leg test positive on the left, negative on the right.  No gait abnormalities noted.  Left shoulder: No gross deformity or discoloration noted.  Full range of motion with mild discomfort at shoulder height.   strength is 4/5, secondary to discomfort.  Negative edema, crepitus, abnormal warmth, or ecchymosis to the joint.  Left knee: No gross deformity or discoloration noted.  There is moderate TTP noted to the medial joint line, patellar joint line, and lateral joint line, denies all other tenderness.  Special tests negative.  No gait abnormalities noted.  Distal pedal and dorsal pulses 2+.  Neck:  There is mild posterior cervical tenderness noted.  Full range of motion noted with mild discomfort with rotation to the left.  No step-offs noted    Assessment/Plan:       1. Strain of muscle, fascia and tendon at neck level, subsequent encounter    2. Injury of left shoulder, subsequent encounter    3. Injury of left knee, subsequent encounter    4. Acute bilateral low back pain with sciatica, sciatica laterality unspecified    Released to Restricted Duty FROM 4/6/2023 TO 5/4/2023       Follow-up in 4 weeks, unless seen by orthopedics  Restricted duty, per physiatry and orthopedics  Physiatry referral placed, transfer care pending scheduling  Physical therapy appointments as scheduled  Knee brace while at work otherwise wean at home as  tolerated  Recommend continue with OTC Tylenol/ibuprofen, ice/heat application, and OTC topical ointments i.e. Tiger balm, Voltaren gel, or Biofreeze  Strict ED precautions discussed with patient    Differential diagnosis, natural history, supportive care, and indications for immediate follow-up discussed.    Approximately 25 minutes was spent in preparing for visit, obtaining history, exam and evaluation, patient counseling/education and post visit documentation/orders.

## 2024-02-13 ENCOUNTER — OCCUPATIONAL MEDICINE (OUTPATIENT)
Dept: OCCUPATIONAL MEDICINE | Facility: CLINIC | Age: 49
End: 2024-02-13
Payer: COMMERCIAL

## 2024-02-13 VITALS
SYSTOLIC BLOOD PRESSURE: 124 MMHG | HEIGHT: 71 IN | DIASTOLIC BLOOD PRESSURE: 84 MMHG | BODY MASS INDEX: 28.56 KG/M2 | HEART RATE: 88 BPM | OXYGEN SATURATION: 97 % | RESPIRATION RATE: 16 BRPM | WEIGHT: 204 LBS | TEMPERATURE: 98.3 F

## 2024-02-13 DIAGNOSIS — M54.40 ACUTE BILATERAL LOW BACK PAIN WITH SCIATICA, SCIATICA LATERALITY UNSPECIFIED: ICD-10-CM

## 2024-02-13 DIAGNOSIS — S89.92XD INJURY OF LEFT KNEE, SUBSEQUENT ENCOUNTER: ICD-10-CM

## 2024-02-13 DIAGNOSIS — S16.1XXD STRAIN OF MUSCLE, FASCIA AND TENDON AT NECK LEVEL, SUBSEQUENT ENCOUNTER: ICD-10-CM

## 2024-02-13 DIAGNOSIS — M54.17 LUMBOSACRAL RADICULOPATHY: ICD-10-CM

## 2024-02-13 DIAGNOSIS — S49.92XD INJURY OF LEFT SHOULDER, SUBSEQUENT ENCOUNTER: ICD-10-CM

## 2024-02-13 DIAGNOSIS — S46.911D STRAIN OF RIGHT SHOULDER, SUBSEQUENT ENCOUNTER: ICD-10-CM

## 2024-02-13 PROCEDURE — 99214 OFFICE O/P EST MOD 30 MIN: CPT | Performed by: NURSE PRACTITIONER

## 2024-02-13 PROCEDURE — 3079F DIAST BP 80-89 MM HG: CPT | Performed by: NURSE PRACTITIONER

## 2024-02-13 PROCEDURE — 3074F SYST BP LT 130 MM HG: CPT | Performed by: NURSE PRACTITIONER

## 2024-02-13 RX ORDER — MELOXICAM 7.5 MG/1
15 TABLET ORAL DAILY
Qty: 30 TABLET | Refills: 1 | Status: SHIPPED | OUTPATIENT
Start: 2024-02-13

## 2024-02-13 NOTE — LETTER
[address],   [address] [place] [address]  Phone:  [phone] - Fax:  [phone]   Occupational Health [facility] Progress Report and Disability Certification  Date of Service: 2/13/2024   No Show:  No  Date / Time of Next Visit:  Discharged/Care transfer 1 time visit    Claim Information   Patient Name: Richard Koenig  Claim Number:     Employer: [employer]  Date of Injury: 1/11/2023     Insurer / TPA: [employer]  ID / SSN:     Occupation: [employer]  Diagnosis: Diagnoses of Strain of muscle, fascia and tendon at neck level, subsequent encounter, Injury of left shoulder, subsequent encounter, Injury of left knee, subsequent encounter, Acute bilateral low back pain with sciatica, sciatica laterality unspecified, Strain of right shoulder, subsequent encounter, and Lumbosacral radiculopathy were pertinent to this visit.    Medical Information   Related to Industrial Injury? Yes    Subjective Complaints:  (1/13/23): Patient reportedly slipped and fell on ice after getting out of the truck on a downhill, stating he fell on his back in a twisting motion. Hit the back of his head.       used at this visit.  Patient states symptoms have remained unchanged, the knee is feeling worse.  Ports he had surgery on the knee and started physical therapy approximately 6 weeks later and has noticed an increase in knee pain.  He is unable to kneel on the knee, has continued swelling, and the pain is constant.  His low back has been feeling increasingly worse.  He notes that he has continued  tingling radiate down both legs, when he goes to stand up he sometimes feels a little weak.  He notes he had 2 episodes of incontinence 1 bowel and 1 bladder.  He states he was told by animal coworker about the bowel and when he was walking he felt wetness on the back of his leg which was coming down from his underwear.  He states if he  bends over or kneels for more than 2 to 3 minutes he gets throbbing and sharp shooting pain.  Patient has no pertinent negatives.  He has been taking meloxicam which takes the edge off.  He takes OTC medication if needed.  He states that the muscle relaxer is not helpful, but he will take it if needed.   He is been laid off from his employer.  He was seen by physiatrist and orthopedics but would like a second opinion as appointments have been rescheduled multiple times making it difficult for him to get in and be seen.  He has done 10 sessions of physical therapy on his knee but this has not improved his pain.  Spinal surgery referral given red flag symptoms placed today.  Orthopedics and physiatry referral for further evaluation and management of continued symptoms.  MRI of the right shoulder ordered.  Additional sessions of physical therapy requested.  Plan of care discussed with patient. 1 time visit to coordinate care per  and    Objective Findings: Lumbar: No gross deformity or discoloration noted.  Decreased range of motion.  Discomfort noted with rotation bilaterally and flexion.  There is mild TTP noted to the lumbar sacral paraspinal musculature.  No muscle spasms appreciated.  Minimal difficulty with heel toe walk.  Straight leg test positive on the left, negative on the right.  Patient notes x 2 loss of bowel or bladder and decreased sensation.  No gait abnormalities noted.  Left shoulder: No gross deformity or discoloration noted.  Full range of motion with mild discomfort at shoulder height.   strength is 4/5, secondary to discomfort.  Negative edema, crepitus, abnormal warmth, or ecchymosis to the joint.  Right shoulder: No gross deformity or discoloration noted.  Full range of motion noted with discomfort above shoulder height.   strength is 4/5, secondary to discomfort.  Pain to the ACJ and GHJ with movement in all planes.  Distal neurovascular strength and sensation  intact.  Left knee: No gross deformity or discoloration noted.  There is moderate TTP noted to the medial joint line, patellar joint line, and lateral joint line, denies all other tenderness.  Special tests negative.  No gait abnormalities noted.  Distal pedal and dorsal pulses 2+.  Neck:  There is mild posterior cervical tenderness noted.  Full range of motion noted with mild discomfort with rotation to the left.  No step-offs noted     Pre-Existing Condition(s):     Assessment:   Condition Worsened    Status: Discharged / Care Transfer  Permanent Disability:No    Plan: MedicationPTTransfer CareDiagnostics    Diagnostics: MRI    Comments:  Follow-up in 6 weeks, unless seen by orthopedics, spine surgery, orthopedic  Restricted duty, per spine surgery, physiatry and orthopedics  Physiatry referral placed, transfer care  Physical therapy appointments as scheduled, additional sessions requested  Knee brace while at work otherwise wean at home as tolerated  May utilize meloxicam as prescribed  Recommend continue with OTC Tylenol/ibuprofen, ice/heat application, and OTC topical ointments i.e. Tiger balm, Voltaren gel, or Biofreeze  Strict ED precautions discussed with patient         Disability Information   Status: Released to Restricted Duty    From:  2024  Through:   Restrictions are: Temporary   Physical Restrictions   Sitting:    Standing:    Stooping:  < or = to 1 hr/day Bending:  < or = to 1 hr/day   Squattin hrs/day Walking:    Climbin hrs/day Pushin hrs/day   Pullin hrs/day Other:    Reaching Above Shoulder (L): < or = to 1 hr/day Reaching Above Shoulder (R): < or = 1 hrs/day     Reaching Below Shoulder (L):    Reaching Below Shoulder (R):      Not to exceed Weight Limits   Carrying(hrs):   Weight Limit(lb): < or = to 10 pounds Lifting(hrs):   Weight  Limit(lb): < or = to 10 pounds   Comments: Avoid pushing, pulling, or lifting anything more than 10 pounds until cleared by the spinal  surgeon    Repetitive Actions   Hands: i.e. Fine Manipulations from Grasping:     Feet: i.e. Operating Foot Controls:     Driving / Operate Machinery:     Health Care Provider’s Original or Electronic Signature  AHMET Morejon Health Care Provider’s Original or Electronic Signature    Enoc Bethea DO MPH     Clinic Name / Location: [address],   [address]  [place] [address] Clinic Phone Number: Dept: [phone]   Appointment Time: 9:30 Am Visit Start Time: 9:03 AM   Check-In Time:  9:02 Am Visit Discharge Time:  9:53 AM   Original-Treating Physician or Chiropractor    Page 2-Insurer/TPA    Page 3-Employer    Page 4-Employee

## 2024-02-13 NOTE — PROGRESS NOTES
Subjective:     Richard Koenig is a 48 y.o. male who presents for Follow-Up      (1/13/23): Patient reportedly slipped and fell on ice after getting out of the truck on a downhill, stating he fell on his back in a twisting motion. Hit the back of his head.       used at this visit.  Patient states symptoms have remained unchanged, the knee is feeling worse.  Ports he had surgery on the knee and started physical therapy approximately 6 weeks later and has noticed an increase in knee pain.  He is unable to kneel on the knee, has continued swelling, and the pain is constant.  His low back has been feeling increasingly worse.  He notes that he has continued  tingling radiate down both legs, when he goes to stand up he sometimes feels a little weak.  He notes he had 2 episodes of incontinence 1 bowel and 1 bladder.  He states he was told by animal coworker about the bowel and when he was walking he felt wetness on the back of his leg which was coming down from his underwear.  He states if he bends over or kneels for more than 2 to 3 minutes he gets throbbing and sharp shooting pain.  Patient has no pertinent negatives.  He has been taking meloxicam which takes the edge off.  He takes OTC medication if needed.  He states that the muscle relaxer is not helpful, but he will take it if needed.   He is been laid off from his employer.  He was seen by physiatrist and orthopedics but would like a second opinion as appointments have been rescheduled multiple times making it difficult for him to get in and be seen.  He has done 10 sessions of physical therapy on his knee but this has not improved his pain.  Spinal surgery referral given red flag symptoms placed today.  Orthopedics and physiatry referral for further evaluation and management of continued symptoms.  MRI of the right shoulder ordered.  Additional sessions of physical therapy requested.  Plan of care discussed with patient. 1 time visit  "to coordinate care per  and     ROS: All systems were reviewed on intake form, form was reviewed and signed. See scanned documents in media. Pertinent positives and negatives included in HPI.    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  Decreased range of motion.  Discomfort noted with rotation bilaterally and flexion.  There is mild TTP noted to the lumbar sacral paraspinal musculature.  No muscle spasms appreciated.  Minimal difficulty with heel toe walk.  Straight leg test positive on the left, negative on the right.  Patient notes x 2 loss of bowel or bladder and decreased sensation.  No gait abnormalities noted.  Left shoulder: No gross deformity or discoloration noted.  Full range of motion with mild discomfort at shoulder height.   strength is 4/5, secondary to discomfort.  Negative edema, crepitus, abnormal warmth, or ecchymosis to the joint.  Right shoulder: No gross deformity or discoloration noted.  Full range of motion noted with discomfort above shoulder height.   strength is 4/5, secondary to discomfort.  Pain to the ACJ and GHJ with movement in all planes.  Distal neurovascular strength and sensation intact.  Left knee: No gross deformity or discoloration noted.  There is moderate TTP noted to the medial joint line, patellar joint line, and lateral joint line, denies all other tenderness.  Special tests negative.  No gait abnormalities noted.  Distal pedal and dorsal pulses 2+.  Neck:  There is mild posterior cervical tenderness noted.  Full range of " motion noted with mild discomfort with rotation to the left.  No step-offs noted      Assessment/Plan:       1. Strain of muscle, fascia and tendon at neck level, subsequent encounter  - meloxicam (MOBIC) 7.5 MG Tab; Take 2 Tablets by mouth every day.  Dispense: 30 Tablet; Refill: 1  - Referral to Spine Surgery  - Referral to Physical Therapy    2. Injury of left shoulder, subsequent encounter  - meloxicam (MOBIC) 7.5 MG Tab; Take 2 Tablets by mouth every day.  Dispense: 30 Tablet; Refill: 1  - Referral to Pain Clinic  - Referral to Physical Therapy    3. Injury of left knee, subsequent encounter  - Referral to Orthopedics  - meloxicam (MOBIC) 7.5 MG Tab; Take 2 Tablets by mouth every day.  Dispense: 30 Tablet; Refill: 1  - Referral to Pain Clinic  - Referral to Physical Therapy    4. Acute bilateral low back pain with sciatica, sciatica laterality unspecified  - meloxicam (MOBIC) 7.5 MG Tab; Take 2 Tablets by mouth every day.  Dispense: 30 Tablet; Refill: 1  - Referral to Spine Surgery  - Referral to Physical Therapy    5. Strain of right shoulder, subsequent encounter  - Referral to Radiology  - MR-SHOULDER-W/O RIGHT; Future  - meloxicam (MOBIC) 7.5 MG Tab; Take 2 Tablets by mouth every day.  Dispense: 30 Tablet; Refill: 1  - Referral to Pain Clinic  - Referral to Physical Therapy    6. Lumbosacral radiculopathy  - meloxicam (MOBIC) 7.5 MG Tab; Take 2 Tablets by mouth every day.  Dispense: 30 Tablet; Refill: 1  - Referral to Spine Surgery  - Referral to Physical Therapy    Released to Restricted Duty FROM 2/13/2024 TO    Avoid pushing, pulling, or lifting anything more than 10 pounds until cleared by the spinal surgeon  Follow-up in 6 weeks, unless seen by orthopedics, spine surgery, orthopedic  Restricted duty, per spine surgery, physiatry and orthopedics  Physiatry referral placed, transfer care  Physical therapy appointments as scheduled, additional sessions requested  Knee brace while at work otherwise wean at  home as tolerated  May utilize meloxicam as prescribed  Recommend continue with OTC Tylenol/ibuprofen, ice/heat application, and OTC topical ointments i.e. Tiger balm, Voltaren gel, or Biofreeze  Strict ED precautions discussed with patient         Differential diagnosis, natural history, supportive care, and indications for immediate follow-up discussed.    Approximately 35 minutes were spent in reviewing notes, preparing for visit, obtaining history, exam and evaluation, patient counseling/education and post visit documentation/orders.

## 2024-03-19 ENCOUNTER — TELEPHONE (OUTPATIENT)
Dept: OCCUPATIONAL MEDICINE | Facility: CLINIC | Age: 49
End: 2024-03-19
Payer: COMMERCIAL